# Patient Record
Sex: FEMALE | Race: BLACK OR AFRICAN AMERICAN | NOT HISPANIC OR LATINO | URBAN - METROPOLITAN AREA
[De-identification: names, ages, dates, MRNs, and addresses within clinical notes are randomized per-mention and may not be internally consistent; named-entity substitution may affect disease eponyms.]

---

## 2021-10-19 ENCOUNTER — EMERGENCY (EMERGENCY)
Facility: HOSPITAL | Age: 61
LOS: 0 days | Discharge: HOME | End: 2021-10-20
Attending: EMERGENCY MEDICINE | Admitting: EMERGENCY MEDICINE
Payer: MEDICARE

## 2021-10-19 VITALS
DIASTOLIC BLOOD PRESSURE: 75 MMHG | WEIGHT: 126.1 LBS | RESPIRATION RATE: 16 BRPM | OXYGEN SATURATION: 99 % | SYSTOLIC BLOOD PRESSURE: 132 MMHG | HEART RATE: 73 BPM | TEMPERATURE: 99 F

## 2021-10-19 DIAGNOSIS — T23.271A BURN OF SECOND DEGREE OF RIGHT WRIST, INITIAL ENCOUNTER: ICD-10-CM
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
## 2021-10-19 DIAGNOSIS — T23.201A BURN OF SECOND DEGREE OF RIGHT HAND, UNSPECIFIED SITE, INITIAL ENCOUNTER: ICD-10-CM

## 2021-10-19 DIAGNOSIS — T31.0 BURNS INVOLVING LESS THAN 10% OF BODY SURFACE: ICD-10-CM
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
## 2021-10-19 DIAGNOSIS — T22.011A BURN OF UNSPECIFIED DEGREE OF RIGHT FOREARM, INITIAL ENCOUNTER: ICD-10-CM

## 2021-10-19 PROCEDURE — 99284 EMERGENCY DEPT VISIT MOD MDM: CPT

## 2021-10-19 RX ADMIN — Medication 1 APPLICATION(S): at 22:33

## 2021-10-19 NOTE — ED PROVIDER NOTE - PATIENT PORTAL LINK FT
You can access the FollowMyHealth Patient Portal offered by [facility] by registering at the following website: [email or web address]. By joining [employer]’s FollowMyHealth portal, you will also be able to view your health information using other applications (apps) compatible with our system.
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
## 2021-10-19 NOTE — ED PROVIDER NOTE - NSFOLLOWUPCLINICS_GEN_ALL_ED_FT
[facility]  Burn  [address], [address]  [place] [address]  Phone: [phone]  Fax:      [facility]  Burn  [address], [address]  [place] [address]  Phone: [phone]  Fax:     [facility]  Burn  [address] [place] [id number]  Phone: [phone]  Fax:
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
## 2021-10-19 NOTE — ED PROVIDER NOTE - PHYSICAL EXAMINATION
GEN: Patient in no acute distress  MS: Normal ROM. pulses 2 +.   SKIN: small circular blisters noted to right forearm and wrist, Warm, dry, no acute rashes. Good turgor  NEURO: Strength 5/5 with no sensory deficits to RUE

## 2021-10-19 NOTE — ED PROVIDER NOTE - NS ED ROS FT
Review of Systems:  	•	CONSTITUTIONAL - no fever, no diaphoresis, no chills  	•	SKIN - + burn  	•	HEMATOLOGIC - no bleeding, no bruising  	•	MUSCULOSKELETAL - no joint paint, no swelling, no redness  	•	NEUROLOGIC - no weakness, no paresthesias
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
## 2021-10-20 NOTE — ED ADULT NURSE REASSESSMENT NOTE - NS ED NURSE REASSESS COMMENT FT1
Pt alert and oriented ambulating awaiting transport.  Pt was given breakfast.  No further interventions at this time. Will continue to assess and montior.
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
## 2021-10-20 NOTE — CHART NOTE - NSCHARTNOTEFT_GEN_A_CORE
SW received a call from Cierra from [facility] [phone] at 10:26am regarding patient returning to her shelter housing. ED CM confirmed she spoke with Cierra from [facility] and provided the address and phone number for the shelter.     SW received a call from security [phone] at 11:58am and was informed patient is requesting to speak with a SW. MAG attempted to call patient's shelter [phone] as well as Cierra from [facility] [phone] multiple times but there was no answer.     MAG went to meet with patient in the ED however she was not in her room. ED  confirmed patient is no longer in the ED. Bedside RN Bel confirmed patient already left with transportation and only required silvadene cream, saline, and dry gauze to apply to her right arm. As per RN, patient was provided with supplies and did not voice any concern over doing the dressings herself.
